# Patient Record
Sex: FEMALE | Race: BLACK OR AFRICAN AMERICAN | NOT HISPANIC OR LATINO | Employment: OTHER | URBAN - METROPOLITAN AREA
[De-identification: names, ages, dates, MRNs, and addresses within clinical notes are randomized per-mention and may not be internally consistent; named-entity substitution may affect disease eponyms.]

---

## 2019-02-03 ENCOUNTER — APPOINTMENT (OUTPATIENT)
Dept: GENERAL RADIOLOGY | Facility: HOSPITAL | Age: 69
End: 2019-02-03

## 2019-02-03 ENCOUNTER — HOSPITAL ENCOUNTER (EMERGENCY)
Facility: HOSPITAL | Age: 69
Discharge: HOME | End: 2019-02-03
Attending: EMERGENCY MEDICINE | Admitting: EMERGENCY MEDICINE

## 2019-02-03 VITALS
HEIGHT: 64 IN | WEIGHT: 170 LBS | SYSTOLIC BLOOD PRESSURE: 175 MMHG | BODY MASS INDEX: 29.02 KG/M2 | RESPIRATION RATE: 18 BRPM | TEMPERATURE: 97.5 F | HEART RATE: 75 BPM | OXYGEN SATURATION: 97 % | DIASTOLIC BLOOD PRESSURE: 105 MMHG

## 2019-02-03 DIAGNOSIS — S70.01XA CONTUSION OF RIGHT HIP, INITIAL ENCOUNTER: ICD-10-CM

## 2019-02-03 DIAGNOSIS — S80.01XA CONTUSION OF RIGHT KNEE, INITIAL ENCOUNTER: Primary | ICD-10-CM

## 2019-02-03 DIAGNOSIS — S63.259A DISLOCATION OF FINGER, INITIAL ENCOUNTER: ICD-10-CM

## 2019-02-03 PROCEDURE — 73140 X-RAY EXAM OF FINGER(S): CPT

## 2019-02-03 PROCEDURE — 73562 X-RAY EXAM OF KNEE 3: CPT

## 2019-02-03 PROCEDURE — 99284 EMERGENCY DEPT VISIT MOD MDM: CPT

## 2019-02-03 PROCEDURE — 73502 X-RAY EXAM HIP UNI 2-3 VIEWS: CPT

## 2019-02-03 RX ORDER — CLONIDINE HYDROCHLORIDE 0.1 MG/1
0.1 TABLET ORAL ONCE
Status: COMPLETED | OUTPATIENT
Start: 2019-02-03 | End: 2019-02-03

## 2019-02-03 RX ORDER — LIDOCAINE HYDROCHLORIDE AND EPINEPHRINE 10; 10 MG/ML; UG/ML
10 INJECTION, SOLUTION INFILTRATION; PERINEURAL ONCE
Status: DISCONTINUED | OUTPATIENT
Start: 2019-02-03 | End: 2019-02-03

## 2019-02-03 RX ORDER — ASPIRIN 81 MG/1
81 TABLET, CHEWABLE ORAL DAILY
COMMUNITY

## 2019-02-03 RX ORDER — HYDROCODONE BITARTRATE AND ACETAMINOPHEN 5; 325 MG/1; MG/1
1 TABLET ORAL EVERY 6 HOURS PRN
Qty: 8 TABLET | Refills: 0 | Status: SHIPPED | OUTPATIENT
Start: 2019-02-03

## 2019-02-03 RX ORDER — LIDOCAINE HYDROCHLORIDE 10 MG/ML
10 INJECTION, SOLUTION EPIDURAL; INFILTRATION; INTRACAUDAL; PERINEURAL ONCE
Status: COMPLETED | OUTPATIENT
Start: 2019-02-03 | End: 2019-02-03

## 2019-02-03 RX ORDER — CLONIDINE HYDROCHLORIDE 0.1 MG/1
0.1 TABLET ORAL ONCE
Status: DISCONTINUED | OUTPATIENT
Start: 2019-02-03 | End: 2019-02-03

## 2019-02-03 RX ADMIN — LIDOCAINE HYDROCHLORIDE 10 ML: 10 INJECTION, SOLUTION EPIDURAL; INFILTRATION; INTRACAUDAL; PERINEURAL at 20:18

## 2019-02-03 RX ADMIN — CLONIDINE HYDROCHLORIDE 0.1 MG: 0.1 TABLET ORAL at 20:27

## 2019-02-03 NOTE — ED TRIAGE NOTES
Pt reports tripped over dog last night, pain to R hip, and pinky LH. Pt was ambulatory on arrival, denies head injury, LOC

## 2019-02-03 NOTE — ED PROVIDER NOTES
EMERGENCY DEPARTMENT ENCOUNTER    CHIEF COMPLAINT  Chief Complaint: right hip pain  History given by: patient  History limited by: none  Room Number: 05/05  PMD: Annamaria Munoz APRN      HPI:  Pt is a 69 y.o. female who presents complaining of right hip pain after tripping over her dog yesterday morning. Pt also complains of left pinky finger pain and right knee pain. Pt has been able to ambulate since the accident. Pt denies cp, abd pain, neck pain, back pain, syncope, head injury and LOC. Pt is not on anticoagulants. Pt states she has hx of hypertension and took her medications this AM. Family at bedside.     Duration:  One day  Onset: gradual  Timing: constant  Location: right hip  Quality: pain  Intensity/Severity: moderate  Progression: unchanged  Associated Symptoms: right knee pain, left pinky finger pain        PAST MEDICAL HISTORY  Active Ambulatory Problems     Diagnosis Date Noted   • No Active Ambulatory Problems     Resolved Ambulatory Problems     Diagnosis Date Noted   • No Resolved Ambulatory Problems     Past Medical History:   Diagnosis Date   • Hypertension        PAST SURGICAL HISTORY  Past Surgical History:   Procedure Laterality Date   • LAPAROSCOPIC TUBAL LIGATION         FAMILY HISTORY  History reviewed. No pertinent family history.    SOCIAL HISTORY  Social History     Socioeconomic History   • Marital status:      Spouse name: Not on file   • Number of children: Not on file   • Years of education: Not on file   • Highest education level: Not on file   Social Needs   • Financial resource strain: Not on file   • Food insecurity - worry: Not on file   • Food insecurity - inability: Not on file   • Transportation needs - medical: Not on file   • Transportation needs - non-medical: Not on file   Occupational History   • Not on file   Tobacco Use   • Smoking status: Never Smoker   Substance and Sexual Activity   • Alcohol use: Yes     Comment: occassionally   • Drug use: Not on file    • Sexual activity: Not on file   Other Topics Concern   • Not on file   Social History Narrative   • Not on file       ALLERGIES  Patient has no known allergies.    REVIEW OF SYSTEMS  Review of Systems   Constitutional: Negative for fever.   HENT: Negative for sore throat.    Eyes: Negative.    Respiratory: Negative for cough and shortness of breath.    Cardiovascular: Negative for chest pain.   Gastrointestinal: Negative for abdominal pain, diarrhea and vomiting.   Genitourinary: Negative for dysuria.   Musculoskeletal: Positive for arthralgias (right hip, right knee, left pinky finger). Negative for neck pain.   Skin: Negative for rash.   Neurological: Negative for weakness, numbness and headaches.   Hematological: Negative.    Psychiatric/Behavioral: Negative.    All other systems reviewed and are negative.      PHYSICAL EXAM  ED Triage Vitals [02/03/19 1750]   Temp Heart Rate Resp BP SpO2   97.5 °F (36.4 °C) 74 18 -- 97 %      Temp src Heart Rate Source Patient Position BP Location FiO2 (%)   Tympanic -- -- -- --       Physical Exam   Constitutional: She is oriented to person, place, and time. No distress.   HENT:   Head: Normocephalic and atraumatic.   Eyes: EOM are normal. Pupils are equal, round, and reactive to light.   Neck: Normal range of motion. Neck supple.   Cardiovascular: Normal rate, regular rhythm and normal heart sounds.   Pulmonary/Chest: Effort normal and breath sounds normal. No respiratory distress.   Abdominal: Soft. There is no tenderness. There is no rebound and no guarding.   Musculoskeletal: Normal range of motion. She exhibits no edema.   Tenderness to left pinky finger, no tenderness to left hand, wrist, and elbow. Tenderness/pain to right hip and knee.    Neurological: She is alert and oriented to person, place, and time. She has normal sensation and normal strength.   Skin: Skin is warm and dry. No rash noted.   Psychiatric: Mood and affect normal.   Nursing note and vitals  reviewed.        RADIOLOGY  XR Finger 2+ View Left   Preliminary Result   1. Posterior/dorsal dislocation of the distal phalange of the 5th digit   of left hand without evidence for fracture.   2. There is no evidence for acute abnormality of the pelvis, right hip   or right knee.   3. There is a nonspecific sclerotic focus within the right ilium   adjacent to the acetabulum. This is most consistent with an area of   benign sclerosis. However, I recommend the area undergo followup imaging   with a radiograph of the right hip in 3 months to ensure stability.          XR Hip With or Without Pelvis 2 - 3 View Right   Preliminary Result   1. Posterior/dorsal dislocation of the distal phalange of the 5th digit   of left hand without evidence for fracture.   2. There is no evidence for acute abnormality of the pelvis, right hip   or right knee.   3. There is a nonspecific sclerotic focus within the right ilium   adjacent to the acetabulum. This is most consistent with an area of   benign sclerosis. However, I recommend the area undergo followup imaging   with a radiograph of the right hip in 3 months to ensure stability.          XR Knee 3 View Right   Preliminary Result   1. Posterior/dorsal dislocation of the distal phalange of the 5th digit   of left hand without evidence for fracture.   2. There is no evidence for acute abnormality of the pelvis, right hip   or right knee.   3. There is a nonspecific sclerotic focus within the right ilium   adjacent to the acetabulum. This is most consistent with an area of   benign sclerosis. However, I recommend the area undergo followup imaging   with a radiograph of the right hip in 3 months to ensure stability.          XR Finger 2+ View Left    (Results Pending)        I ordered the above noted radiological studies. Interpreted by radiologist. Reviewed by me in PACS.         PROGRESS AND CONSULTS  ED Course as of Feb 03 2025   Sun Feb 03, 2019 2013 8:14 PM  Patient with fall  and had finger dislocation.  Knee xray good.  Hip xray shows nothing acute but has a bone lesion that will need 3 month follow up.  Finger reduced.  Will splint and refer to hand surgeon.  Unsure what her dose of blood pressure medications.  Will follow up with PMD.  [SL]      ED Course User Index  [SL] Armen Condon MD     1847-Ordered XR right hip, XR right knee, and XR left finger for further evaluation.     1938-Discussed pt case with Jonathan Green (PA) who is going to perform reduction of left 5th finger dislocation.     2012-Rechecked pt. Pt is resting comfortably. Notified pt of XR right knee-negative and XR right hip-negative for fracture but needs f/u in about 3 months for lesion. Pt has had left 5th digit reduction by Jonathan Green (PA) confirmed with XR. Discussed the plan to discharge the pt home with splint for her finger. I instructed the pt to f/u with PCP for repeat XR right hip and hand surgery for f/u on left finger dislocation. Instructed pt she ziggy need to f/u with her PCP for management of her hypertension. Pt understands and agrees with the plan, all questions answered.    2025-Ordered clondine for hypertension prior to discharge.     MEDICAL DECISION MAKING  Results were reviewed/discussed with the patient and they were also made aware of online access. Pt also made aware that some labs, such as cultures, will not be resulted during ER visit and follow up with PMD is necessary.     MDM  Number of Diagnoses or Management Options     Amount and/or Complexity of Data Reviewed  Tests in the radiology section of CPT®: reviewed and ordered (XR left finger-left 5th digits dislocation  XR right knee-negative acute  XR right hip-negative fracture)  Decide to obtain previous medical records or to obtain history from someone other than the patient: yes  Independent visualization of images, tracings, or specimens: yes           DIAGNOSIS  Final diagnoses:   Contusion of right knee, initial encounter    Contusion of right hip, initial encounter   Dislocation of finger, initial encounter       DISPOSITION  DISCHARGE    Patient discharged in stable condition.    Reviewed implications of results, diagnosis, meds, responsibility to follow up, warning signs and symptoms of possible worsening, potential complications and reasons to return to ER.    Patient/Family voiced understanding of above instructions.    Discussed plan for discharge, as there is no emergent indication for admission. Patient referred to primary care provider for BP management due to today's BP. Pt/family is agreeable and understands need for follow up and repeat testing.  Pt is aware that discharge does not mean that nothing is wrong but it indicates no emergency is present that requires admission and they must continue care with follow-up as given below or physician of their choice.     FOLLOW-UP  Annamaria Munoz, APRN  [address]  [facility] [id number] [phone]    Schedule an appointment as soon as possible for a visit in 1 day      Yumiko Ahuja MD  [address] [facility] [address]  [facility] [id number] [phone]    Schedule an appointment as soon as possible for a visit            Medication List      No changes were made to your prescriptions during this visit.           Latest Documented Vital Signs:  As of 8:25 PM  BP- (!) 206/116 HR- 74 Temp- 97.5 °F (36.4 °C) (Tympanic) O2 sat- 97%    --  Documentation assistance provided by alex Olivo for .  Information recorded by the scribe was done at my direction and has been verified and validated by me.                Dennise Olivo  02/03/19 2057       Armen Condon MD  02/03/19 [id number]

## 2019-02-04 NOTE — DISCHARGE INSTRUCTIONS
You need to monitor blood pressure.  Make sure you are taking your medication and follow up with PMD.  You will need a repeat pelvis x ray in 3 months.

## 2019-02-04 NOTE — ED PROVIDER NOTES
FX Dislocation  Date/Time: 2/3/2019 7:42 PM  Performed by: Jonathan Green PA  Authorized by: Armen Condon MD     Consent:     Consent obtained:  Verbal    Consent given by:  Patient  Injury:     Injury location:  Finger    Finger injury location:  L little finger  Pre-procedure assessment:     Neurological function: normal      Distal perfusion: normal      Range of motion: reduced    Anesthesia (see MAR for exact dosages):     Anesthesia method:  Nerve block    Block needle gauge:  27 G    Block anesthetic:  Lidocaine 1% w/o epi    Block technique:  Dorsal approach  Procedure details:     Manipulation performed: yes      Skin traction used: yes      Reduction successful: yes      X-ray confirmed reduction: yes    Post-procedure assessment:     Neurological function: normal      Distal perfusion: normal      Range of motion: improved      Patient tolerance of procedure:  Tolerated well, no immediate complications                  Documentation assistance provided by alex Brown for Jonathan Green PA-C.  Information recorded by the scribe was done at my direction and has been verified and validated by me.       Gypsy Brown  02/03/19 2003       Jonathan Green PA  02/03/19 2030